# Patient Record
Sex: FEMALE | Race: WHITE | NOT HISPANIC OR LATINO | URBAN - METROPOLITAN AREA
[De-identification: names, ages, dates, MRNs, and addresses within clinical notes are randomized per-mention and may not be internally consistent; named-entity substitution may affect disease eponyms.]

---

## 2017-03-27 ENCOUNTER — EMERGENCY (EMERGENCY)
Facility: HOSPITAL | Age: 27
LOS: 1 days | Discharge: HOME | End: 2017-03-27
Attending: EMERGENCY MEDICINE | Admitting: EMERGENCY MEDICINE
Payer: COMMERCIAL

## 2017-03-27 VITALS
DIASTOLIC BLOOD PRESSURE: 87 MMHG | SYSTOLIC BLOOD PRESSURE: 108 MMHG | RESPIRATION RATE: 16 BRPM | OXYGEN SATURATION: 100 % | HEART RATE: 61 BPM

## 2017-03-27 VITALS
HEART RATE: 53 BPM | DIASTOLIC BLOOD PRESSURE: 49 MMHG | SYSTOLIC BLOOD PRESSURE: 86 MMHG | RESPIRATION RATE: 18 BRPM | OXYGEN SATURATION: 99 % | TEMPERATURE: 98 F

## 2017-03-27 LAB
ALBUMIN SERPL ELPH-MCNC: 4.3 G/DL — SIGNIFICANT CHANGE UP (ref 3.3–5)
ALP SERPL-CCNC: 50 U/L — SIGNIFICANT CHANGE UP (ref 40–120)
ALT FLD-CCNC: 11 U/L — SIGNIFICANT CHANGE UP (ref 4–33)
AST SERPL-CCNC: 16 U/L — SIGNIFICANT CHANGE UP (ref 4–32)
BASOPHILS # BLD AUTO: 0.03 K/UL — SIGNIFICANT CHANGE UP (ref 0–0.2)
BASOPHILS NFR BLD AUTO: 0.3 % — SIGNIFICANT CHANGE UP (ref 0–2)
BILIRUB SERPL-MCNC: 0.2 MG/DL — SIGNIFICANT CHANGE UP (ref 0.2–1.2)
BUN SERPL-MCNC: 25 MG/DL — HIGH (ref 7–23)
CALCIUM SERPL-MCNC: 9.1 MG/DL — SIGNIFICANT CHANGE UP (ref 8.4–10.5)
CHLORIDE SERPL-SCNC: 103 MMOL/L — SIGNIFICANT CHANGE UP (ref 98–107)
CO2 SERPL-SCNC: 25 MMOL/L — SIGNIFICANT CHANGE UP (ref 22–31)
CREAT SERPL-MCNC: 0.95 MG/DL — SIGNIFICANT CHANGE UP (ref 0.5–1.3)
EOSINOPHIL # BLD AUTO: 0.12 K/UL — SIGNIFICANT CHANGE UP (ref 0–0.5)
EOSINOPHIL NFR BLD AUTO: 1.3 % — SIGNIFICANT CHANGE UP (ref 0–6)
GLUCOSE SERPL-MCNC: 111 MG/DL — HIGH (ref 70–99)
HCG SERPL-ACNC: < 5 MIU/ML — SIGNIFICANT CHANGE UP
HCT VFR BLD CALC: 39.7 % — SIGNIFICANT CHANGE UP (ref 34.5–45)
HGB BLD-MCNC: 13.2 G/DL — SIGNIFICANT CHANGE UP (ref 11.5–15.5)
IMM GRANULOCYTES NFR BLD AUTO: 0.1 % — SIGNIFICANT CHANGE UP (ref 0–1.5)
LYMPHOCYTES # BLD AUTO: 4.34 K/UL — HIGH (ref 1–3.3)
LYMPHOCYTES # BLD AUTO: 46.3 % — HIGH (ref 13–44)
MCHC RBC-ENTMCNC: 30.3 PG — SIGNIFICANT CHANGE UP (ref 27–34)
MCHC RBC-ENTMCNC: 33.2 % — SIGNIFICANT CHANGE UP (ref 32–36)
MCV RBC AUTO: 91.1 FL — SIGNIFICANT CHANGE UP (ref 80–100)
MONOCYTES # BLD AUTO: 0.63 K/UL — SIGNIFICANT CHANGE UP (ref 0–0.9)
MONOCYTES NFR BLD AUTO: 6.7 % — SIGNIFICANT CHANGE UP (ref 2–14)
NEUTROPHILS # BLD AUTO: 4.25 K/UL — SIGNIFICANT CHANGE UP (ref 1.8–7.4)
NEUTROPHILS NFR BLD AUTO: 45.3 % — SIGNIFICANT CHANGE UP (ref 43–77)
PLATELET # BLD AUTO: 264 K/UL — SIGNIFICANT CHANGE UP (ref 150–400)
PMV BLD: 9.8 FL — SIGNIFICANT CHANGE UP (ref 7–13)
POTASSIUM SERPL-MCNC: 3.7 MMOL/L — SIGNIFICANT CHANGE UP (ref 3.5–5.3)
POTASSIUM SERPL-SCNC: 3.7 MMOL/L — SIGNIFICANT CHANGE UP (ref 3.5–5.3)
PROT SERPL-MCNC: 7.5 G/DL — SIGNIFICANT CHANGE UP (ref 6–8.3)
RBC # BLD: 4.36 M/UL — SIGNIFICANT CHANGE UP (ref 3.8–5.2)
RBC # FLD: 12.3 % — SIGNIFICANT CHANGE UP (ref 10.3–14.5)
SODIUM SERPL-SCNC: 142 MMOL/L — SIGNIFICANT CHANGE UP (ref 135–145)
WBC # BLD: 9.38 K/UL — SIGNIFICANT CHANGE UP (ref 3.8–10.5)
WBC # FLD AUTO: 9.38 K/UL — SIGNIFICANT CHANGE UP (ref 3.8–10.5)

## 2017-03-27 PROCEDURE — 93010 ELECTROCARDIOGRAM REPORT: CPT | Mod: 59

## 2017-03-27 PROCEDURE — 99284 EMERGENCY DEPT VISIT MOD MDM: CPT | Mod: 25

## 2017-03-27 RX ORDER — SODIUM CHLORIDE 9 MG/ML
1000 INJECTION INTRAMUSCULAR; INTRAVENOUS; SUBCUTANEOUS ONCE
Qty: 0 | Refills: 0 | Status: COMPLETED | OUTPATIENT
Start: 2017-03-27 | End: 2017-03-27

## 2017-03-27 RX ORDER — SODIUM CHLORIDE 9 MG/ML
3 INJECTION INTRAMUSCULAR; INTRAVENOUS; SUBCUTANEOUS ONCE
Qty: 0 | Refills: 0 | Status: COMPLETED | OUTPATIENT
Start: 2017-03-27 | End: 2017-03-27

## 2017-03-27 RX ORDER — ONDANSETRON 8 MG/1
4 TABLET, FILM COATED ORAL ONCE
Qty: 0 | Refills: 0 | Status: COMPLETED | OUTPATIENT
Start: 2017-03-27 | End: 2017-03-27

## 2017-03-27 RX ADMIN — SODIUM CHLORIDE 6000 MILLILITER(S): 9 INJECTION INTRAMUSCULAR; INTRAVENOUS; SUBCUTANEOUS at 09:17

## 2017-03-27 RX ADMIN — SODIUM CHLORIDE 2000 MILLILITER(S): 9 INJECTION INTRAMUSCULAR; INTRAVENOUS; SUBCUTANEOUS at 10:06

## 2017-03-27 RX ADMIN — ONDANSETRON 4 MILLIGRAM(S): 8 TABLET, FILM COATED ORAL at 09:24

## 2017-03-27 RX ADMIN — SODIUM CHLORIDE 3 MILLILITER(S): 9 INJECTION INTRAMUSCULAR; INTRAVENOUS; SUBCUTANEOUS at 09:16

## 2017-03-27 NOTE — ED PROVIDER NOTE - CARE PLAN
Principal Discharge DX:	Near syncope Principal Discharge DX:	Near syncope  Secondary Diagnosis:	Vasovagal episode

## 2017-03-27 NOTE — ED ADULT TRIAGE NOTE - CHIEF COMPLAINT QUOTE
pt comes from the OR, PA student felt lightheaded whle standing.  pt reports she has a very heavy period she is on day 4, pt appears pale.

## 2017-03-27 NOTE — ED PROVIDER NOTE - OBJECTIVE STATEMENT
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 Has been having heavy periods past few days (1 tampon/hour). Has not had period in a few years due to OCPs, which she recently stopped.

## 2017-03-27 NOTE — ED PROVIDER NOTE - ATTENDING CONTRIBUTION TO CARE
DR. GENTILE, ATTENDING MD-  I performed a face to face bedside interview with patient regarding history of present illness, review of symptoms and past medical history. I completed an independent physical exam.  I have discussed patient's plan of care with the resident.   Documentation as above in the note.    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  Heavy menses lately secondary to recent d/c of ocp's.  Denies f/c, ha, neck stiffness, cp, sob, cough, abd pain, n/v/d, dysuria, rash.  Afebrile, uncomfortable, ctabil, s1s2 devyn no m/r/g, abd soft non ttp no r/g, no cva tenderness b/l, no leg swelling b/l, no rash.  EKG sinus devyn, no st elevations or depressions, no t wave inversions, no prolonged qtc, no heart block, no delta or epsilon waves, no lvh.  Likely vasovagal episode given bradycardia / low bp.  Consider ectopic pregnancy with rupture or anemia secondary to heavy vag bleed.  Obtain cbc, bmp, serum hcg, give ivf, ondansetron, reassess.

## 2017-03-27 NOTE — ED ADULT NURSE NOTE - OBJECTIVE STATEMENT
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 Denies LOC. Pt states she was on birth control and came off it recently, has her first menstrual period since then and states it's very heavy, reports changing tampons every hour. Pt denies prior history of heavy menstruation. Pt also noted to have an urge to have frequent bowel movements, reports diarrhea. Denies PMH. Noted hypotensive and bradycardic in room, labs sent, IV placed, NS infusing, MD at bedside for eval.

## 2017-05-10 PROBLEM — Z00.00 ENCOUNTER FOR PREVENTIVE HEALTH EXAMINATION: Status: ACTIVE | Noted: 2017-05-10

## 2017-06-16 ENCOUNTER — OTHER (OUTPATIENT)
Age: 27
End: 2017-06-16

## 2017-06-21 ENCOUNTER — APPOINTMENT (OUTPATIENT)
Dept: ORTHOPEDIC SURGERY | Facility: CLINIC | Age: 27
End: 2017-06-21

## 2018-12-18 NOTE — ED PROVIDER NOTE - MEDICAL DECISION MAKING DETAILS
AFTER SURGERY DISCHARGE INSTRUCTIONS  (Dr. Gus Gonsalez MD)      Your eye will take approximately 6-8 weeks to heal.  To prevent any complications, it is important for you to understand and follow these instructions.    Protection of your eye    · For the first (1) day after surgery, your eye should be protected at all times, either with an eye pad during the day or an eye pad and metal shield at night.  Make sure your eye is closed and apply the patch tight enough so you cannot blink under the patch.  After this, glasses may be worn during the day and the metal shield at night per your surgeon's instructions.   · Do not get water of anything unclean into your eye.   · Avoid vigorous shaving and brushing of your teeth and hair.  You may wash your hair gently after one week, but wear an eye patch.   · Never wipe your eye with a soiled tissue or handkerchief.      Activity    Position: Any position is appropriate.    · Avoid running, jumping, sudden jerky movements, or exerting yourself excessively at anything for 3 weeks.   · Your surgeon will tell you when it is safe for you to drive  · You may swim after 6 weeks  · You may watch television and read if possible  · You may not have depth perception, so you are at a very high risk for falling.  · You may resume your normal home diet.    Call your doctor if you have any of the following signs or symptoms:    · Pus-like discharge from your eye (a small amount of discharge is to be expected)  · Pain in your eye that persists and is not relieved by pain medications.  A scratchy feeling is to be expected.   · Sudden decrease in your vision  · Nausea and vomiting that persists and is not relieved by medication.  This may be an indication of high eye pressure and it is important to let your doctor know right away.      Administration of eye drops    1.  Please leave your eye patch in place until you see the doctor tomorrow for your follow-up appointment, you will begin  drops starting at your appointment.  (Please bring all eye drops with you)  2.  Begin your eye drops according to the schedule.  3.  Wash your hands thoroughly with soap and water.  4.  Remove your eye patch (if on).  Discard the soiled eye pad (if on).  5.  Tilt your head back, look toward the ceiling with both eyes open.   6.  With one hand, pull the lower lid down.   7.  Put a drop of medicine in the sac behind the lashes of the lower lid.   8.  Do not give more than one eye drop at a time.  After the drop, close your eye and wait 5 minutes between drops.    9.  Ask a friend or relative to check and see if you actually are getting the eye drops into the eye.     Please contact the office if you have any questions.    __ [address] [place] [address]  [phone]    __ [employer] [facility] [address] [place], [phone]    __[facility] [address] [place] [address]  [phone]    __[address] [id number] [phone]    __ [place] [address], [address]     Care After Anesthesia or Sedation    After Discharge  · Due to the medicine given, someone must drive you home.  If possible, have someone stay with you at home the day of discharge and the night after surgery.  · If you have infants or small children at home, please have someone help you for at least 24 hours after your surgery.  · Do not drive for at least 24 hours after surgery (or as told by your doctor).  · Rest for the remainder of the day. Go up and down stairs slowly.  · Do not smoke after surgery.  Smoking can delay healing.  · Do not operate heavy or potential harmful equipment.  · Do not make legally binding decisions.  · Do no drink alcohol for 24 hours.    Diet  · Nausea may be expected for the first 24 to 48 hours.  Start eating a bland diet (toast, gelatin, 7-up, hot cereal, crackers,  jailynt, broth, soup).  · Drink plenty of fluids (6 to 8 glasses of water).  · Resume your regular diet as able.  · Avoid greasy or spicy foods for 24 hours.    Urination  · The effects of anesthetics may cause some people to have trouble passing urine the day of surgery.  Drink a lot of fluids to help prevent this.  · Try to urinate within 12 hours of surgery.  · If you are unable to pass urine and feel like you need to, call your doctor or the hospital.    Pain Control  · If your incision was injected with a long acting local anesthetic, it will wear off in 4 to 6 hours.  You can expect to have some pain at this time.  · Treat your pain with the prescribed pain medicine before it wears off.  Do not wait until your pain becomes severe.    · Ask your nurse when you had your last pain medicine, so you know when you can take another one after you get home.    · Your last pain medicine was given at _________.    Call your doctor if you have:    · Nausea and vomiting that does not stop  · Fever over 101 degrees F.  · Pain not relieved by pain medication  · If you feel you have to pass urine and you are not able to do so.  · Unusual changes in behavior  · Dizziness  · Hives     If you are not able to reach your doctor, you may call the emergency department.     26F w/ no sig PMH presents with presyncope, hypotension & bradycardia in setting of severe menorrhagia, exam & ECG WNL, CBC WNL, slightly prerenal. given zofran , fluids, now normotensive and symptoms resolved. Advised pt to f/u with GYN for menorrhagia etc, stable for d/c.

## 2022-03-31 NOTE — ED ADULT NURSE NOTE - CAS EDN DISCHARGE INTERVENTIONS
[Dear  ___] : Dear  [unfilled], [Kristen Forbes MD\par Diplomate, [employer] and Neu[employer]\par Board Certified in the Subspecialty of Clinical Neurophysiology\par Board Certified in the Subspecialty of Sleep Medicine\par Board Certified in the Subspecialty of Epilepsy\par  IV discontinued, cath removed intact

## 2022-10-23 NOTE — ED PROVIDER NOTE - QUALITY

## 2024-03-20 NOTE — ED PROVIDER NOTE - NS ED ATTENDING STATEMENT MOD
[Annual Wellness Visit] : an annual wellness visit
I have personally seen and examined this patient. I have fully participated in the care of this patient. I have reviewed all pertinent clinical information, including history physical exam, plan and the Resident's note and agree except as noted